# Patient Record
Sex: MALE | Race: WHITE | URBAN - NONMETROPOLITAN AREA
[De-identification: names, ages, dates, MRNs, and addresses within clinical notes are randomized per-mention and may not be internally consistent; named-entity substitution may affect disease eponyms.]

---

## 2021-05-11 ENCOUNTER — APPOINTMENT (OUTPATIENT)
Age: 41
Setting detail: DERMATOLOGY
End: 2021-05-11

## 2021-05-11 VITALS — TEMPERATURE: 97.3 F

## 2021-05-11 DIAGNOSIS — L65.9 NONSCARRING HAIR LOSS, UNSPECIFIED: ICD-10-CM

## 2021-05-11 DIAGNOSIS — T1490XA CONTUSION OF UNSPECIFIED SITE: ICD-10-CM

## 2021-05-11 PROBLEM — S00.83XA CONTUSION OF OTHER PART OF HEAD, INITIAL ENCOUNTER: Status: ACTIVE | Noted: 2021-05-11

## 2021-05-11 PROCEDURE — OTHER TREATMENT REGIMEN: OTHER

## 2021-05-11 PROCEDURE — OTHER COUNSELING: OTHER

## 2021-05-11 PROCEDURE — OTHER MIPS QUALITY: OTHER

## 2021-05-11 PROCEDURE — 99203 OFFICE O/P NEW LOW 30 MIN: CPT

## 2021-05-11 ASSESSMENT — LOCATION ZONE DERM
LOCATION ZONE: SCALP
LOCATION ZONE: FACE

## 2021-05-11 ASSESSMENT — LOCATION DETAILED DESCRIPTION DERM
LOCATION DETAILED: RIGHT SUPERIOR PARIETAL SCALP
LOCATION DETAILED: RIGHT SUPERIOR CENTRAL MALAR CHEEK

## 2021-05-11 ASSESSMENT — LOCATION SIMPLE DESCRIPTION DERM
LOCATION SIMPLE: RIGHT CHEEK
LOCATION SIMPLE: SCALP

## 2021-05-11 NOTE — HPI: SKIN LESION
What Type Of Note Output Would You Prefer (Optional)?: Standard Output
How Severe Is Your Skin Lesion?: mild
treated_been_treated
Is This A New Presentation, Or A Follow-Up?: Skin Lesion
Additional History: Patient has photos / videos of the site as it began up to how it has transitioned up till now.

## 2021-05-11 NOTE — PROCEDURE: TREATMENT REGIMEN
Discontinue Regimen: Curoxen
Initiate Treatment: Minoxidil BID, Nutrafol supplement (per patient he already has this at home but has not yet used)
Detail Level: Zone
Continue Regimen: Topical finasteride, niacinamide
Plan: Patient has had a hair transplant, exosome therapy, uses the laser cap, and has taken oral finasteride but experienced sexual side effects and discontinued. I recommend that he see Dr. Tellez in [place] for further eval and management
Plan: Patient had Belotero filler placed in tear troughs via cannula injections just under one month ago at an outside practice. Approximately 1-2 weeks after filler, he developed what he describes as a pimple on his right cheek at the cannula injection site. Patient attempted to remove the overlying skin and \"pop\" this lesion, but was unable to express any contents other than blood. He presented to the treating physician who prescribed oral antibiotics. Patient has completed one week of oral abx and says the lesion has not changed significantly. On exam, this is a soft ecchymotic papule - no evidence of infection present. Not tender to palpation. I suggest that patient take serial photos to track progression of lesion to monitor for changes, and to apply arnica gel daily to help with resolution. I also recommend that he follow up in 2 weeks with provider that injected the filler if the lesion still has not resolved.
Initiate Treatment: Arnicare gel, gentle massage to the area